# Patient Record
Sex: FEMALE | Race: OTHER | HISPANIC OR LATINO
[De-identification: names, ages, dates, MRNs, and addresses within clinical notes are randomized per-mention and may not be internally consistent; named-entity substitution may affect disease eponyms.]

---

## 2021-05-04 PROBLEM — Z00.00 ENCOUNTER FOR PREVENTIVE HEALTH EXAMINATION: Status: ACTIVE | Noted: 2021-05-04

## 2022-02-11 ENCOUNTER — APPOINTMENT (OUTPATIENT)
Dept: RHEUMATOLOGY | Facility: CLINIC | Age: 42
End: 2022-02-11

## 2023-04-16 ENCOUNTER — OFFICE (OUTPATIENT)
Dept: URBAN - METROPOLITAN AREA CLINIC 12 | Facility: CLINIC | Age: 43
Setting detail: OPHTHALMOLOGY
End: 2023-04-16
Payer: COMMERCIAL

## 2023-04-16 DIAGNOSIS — H16.223: ICD-10-CM

## 2023-04-16 DIAGNOSIS — H01.004: ICD-10-CM

## 2023-04-16 DIAGNOSIS — H01.001: ICD-10-CM

## 2023-04-16 PROCEDURE — 92014 COMPRE OPH EXAM EST PT 1/>: CPT | Performed by: STUDENT IN AN ORGANIZED HEALTH CARE EDUCATION/TRAINING PROGRAM

## 2023-04-16 ASSESSMENT — KERATOMETRY
OD_AXISANGLE_DEGREES: 059
OD_K1POWER_DIOPTERS: 43.50
OS_AXISANGLE_DEGREES: 108
OS_K2POWER_DIOPTERS: 44.25
OS_K1POWER_DIOPTERS: 44.00
METHOD_AUTO_MANUAL: AUTO
OD_K2POWER_DIOPTERS: 44.00

## 2023-04-16 ASSESSMENT — AXIALLENGTH_DERIVED
OS_AL: 23.0349
OS_AL: 23.0349
OD_AL: 23.2613
OD_AL: 23.2613

## 2023-04-16 ASSESSMENT — REFRACTION_AUTOREFRACTION
OS_AXIS: 109
OD_CYLINDER: -0.25
OD_AXIS: 041
OS_CYLINDER: -0.25
OS_SPHERE: +1.00
OD_SPHERE: +0.75

## 2023-04-16 ASSESSMENT — REFRACTION_MANIFEST
OD_VA1: 20/20
OD_SPHERE: PLANO
OS_AXIS: 118
OD_CYLINDER: -0.25
OD_VA1: 20/20
OS_AXIS: 110
OD_SPHERE: +0.75
OD_CYLINDER: -0.25
OD_AXIS: 014
OS_SPHERE: PLANO
OU_VA: 20/20-1
OS_CYLINDER: -0.50
OS_SPHERE: +1.00
OS_VA1: 20/20-2
OS_CYLINDER: -0.25
OD_AXIS: 040

## 2023-04-16 ASSESSMENT — SPHEQUIV_DERIVED
OS_SPHEQUIV: 0.875
OS_SPHEQUIV: 0.875
OD_SPHEQUIV: 0.625
OD_SPHEQUIV: 0.625

## 2023-04-16 ASSESSMENT — CONFRONTATIONAL VISUAL FIELD TEST (CVF)
OS_FINDINGS: FULL
OD_FINDINGS: FULL

## 2023-04-16 ASSESSMENT — LID EXAM ASSESSMENTS
OD_BLEPHARITIS: RUL T 1+
OS_BLEPHARITIS: LUL T 1+

## 2023-04-16 ASSESSMENT — SUPERFICIAL PUNCTATE KERATITIS (SPK)
OD_SPK: 2+
OS_SPK: 2+

## 2023-04-16 ASSESSMENT — VISUAL ACUITY
OS_BCVA: 20/20-2
OD_BCVA: 20/25

## 2023-04-16 ASSESSMENT — TONOMETRY
OS_IOP_MMHG: 17
OD_IOP_MMHG: 15

## 2023-07-16 ENCOUNTER — OFFICE (OUTPATIENT)
Dept: URBAN - METROPOLITAN AREA CLINIC 12 | Facility: CLINIC | Age: 43
Setting detail: OPHTHALMOLOGY
End: 2023-07-16
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
## 2023-07-16 PROCEDURE — NO SHOW FE NO SHOW FEE: Performed by: STUDENT IN AN ORGANIZED HEALTH CARE EDUCATION/TRAINING PROGRAM

## 2023-08-24 ENCOUNTER — APPOINTMENT (OUTPATIENT)
Dept: SPINE | Facility: CLINIC | Age: 43
End: 2023-08-24
Payer: COMMERCIAL

## 2023-08-24 ENCOUNTER — RESULT REVIEW (OUTPATIENT)
Age: 43
End: 2023-08-24

## 2023-08-24 ENCOUNTER — NON-APPOINTMENT (OUTPATIENT)
Age: 43
End: 2023-08-24

## 2023-08-24 ENCOUNTER — OUTPATIENT (OUTPATIENT)
Dept: OUTPATIENT SERVICES | Facility: HOSPITAL | Age: 43
LOS: 1 days | End: 2023-08-24
Payer: COMMERCIAL

## 2023-08-24 VITALS
BODY MASS INDEX: 27.99 KG/M2 | HEIGHT: 65 IN | OXYGEN SATURATION: 98 % | SYSTOLIC BLOOD PRESSURE: 109 MMHG | HEART RATE: 72 BPM | RESPIRATION RATE: 18 BRPM | DIASTOLIC BLOOD PRESSURE: 75 MMHG | WEIGHT: 168 LBS

## 2023-08-24 DIAGNOSIS — Z78.9 OTHER SPECIFIED HEALTH STATUS: ICD-10-CM

## 2023-08-24 DIAGNOSIS — M19.90 UNSPECIFIED OSTEOARTHRITIS, UNSPECIFIED SITE: ICD-10-CM

## 2023-08-24 DIAGNOSIS — M51.36 OTHER INTERVERTEBRAL DISC DEGENERATION, LUMBAR REGION: ICD-10-CM

## 2023-08-24 PROCEDURE — 72114 X-RAY EXAM L-S SPINE BENDING: CPT

## 2023-08-24 PROCEDURE — 72114 X-RAY EXAM L-S SPINE BENDING: CPT | Mod: 26

## 2023-08-24 PROCEDURE — 99203 OFFICE O/P NEW LOW 30 MIN: CPT

## 2023-08-24 NOTE — PHYSICAL EXAM
[Normal] : Gait: normal [] : Sensory: [2+] : left ankle jerk 2+ [Philippe's Sign] : negative Philippe's sign [de-identified] : MRI L-spine wo in May 2023 @ [facility] showed minimal disc bulge @ L4-S1 without thecal sac compression. otherwise unremarkable. Xray L-spine 6 views today in PACS showed no motion dynamic.

## 2023-08-24 NOTE — ASSESSMENT
[FreeTextEntry1] : Images reviewed by Dr. Linares today, unremarkable images. No neurosurgical intervention is recommended at this time.  PLAN - referral to rheumatology for autoimmune disease evaluation - continue conservative management - RTC PRN
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
## 2023-11-12 ENCOUNTER — OFFICE (OUTPATIENT)
Dept: URBAN - METROPOLITAN AREA CLINIC 12 | Facility: CLINIC | Age: 43
Setting detail: OPHTHALMOLOGY
End: 2023-11-12
Payer: COMMERCIAL

## 2023-11-12 DIAGNOSIS — H04.122: ICD-10-CM

## 2023-11-12 DIAGNOSIS — H04.121: ICD-10-CM

## 2023-11-12 DIAGNOSIS — H16.223: ICD-10-CM

## 2023-11-12 DIAGNOSIS — H04.123: ICD-10-CM

## 2023-11-12 DIAGNOSIS — H01.001: ICD-10-CM

## 2023-11-12 DIAGNOSIS — H01.004: ICD-10-CM

## 2023-11-12 PROCEDURE — 83861 MICROFLUID ANALY TEARS: CPT | Mod: QW,RT | Performed by: STUDENT IN AN ORGANIZED HEALTH CARE EDUCATION/TRAINING PROGRAM

## 2023-11-12 PROCEDURE — 99213 OFFICE O/P EST LOW 20 MIN: CPT | Performed by: STUDENT IN AN ORGANIZED HEALTH CARE EDUCATION/TRAINING PROGRAM

## 2023-11-12 PROCEDURE — 83861 MICROFLUID ANALY TEARS: CPT | Mod: QW,LT | Performed by: STUDENT IN AN ORGANIZED HEALTH CARE EDUCATION/TRAINING PROGRAM

## 2023-11-12 ASSESSMENT — REFRACTION_AUTOREFRACTION
OS_AXIS: 126
OS_SPHERE: +1.00
OD_CYLINDER: -0.25
OD_AXIS: 025
OS_CYLINDER: -0.25
OD_SPHERE: +0.75

## 2023-11-12 ASSESSMENT — LID EXAM ASSESSMENTS
OS_BLEPHARITIS: LUL T 1+
OD_BLEPHARITIS: RUL T 1+

## 2023-11-12 ASSESSMENT — REFRACTION_MANIFEST
OS_AXIS: 118
OD_AXIS: 040
OS_AXIS: 110
OS_VA1: 20/20-2
OD_VA1: 20/20
OD_CYLINDER: -0.25
OU_VA: 20/20-1
OD_CYLINDER: -0.25
OD_VA1: 20/20
OD_AXIS: 014
OS_CYLINDER: -0.25
OS_SPHERE: +1.00
OS_CYLINDER: -0.50
OD_SPHERE: PLANO
OD_SPHERE: +0.75
OS_SPHERE: PLANO

## 2023-11-12 ASSESSMENT — SUPERFICIAL PUNCTATE KERATITIS (SPK)
OD_SPK: 2+
OS_SPK: 2+

## 2023-11-12 ASSESSMENT — SPHEQUIV_DERIVED
OD_SPHEQUIV: 0.625
OD_SPHEQUIV: 0.625
OS_SPHEQUIV: 0.875
OS_SPHEQUIV: 0.875

## 2023-11-12 ASSESSMENT — CONFRONTATIONAL VISUAL FIELD TEST (CVF)
OD_FINDINGS: FULL
OS_FINDINGS: FULL

## 2023-11-12 ASSESSMENT — PUNCTA - ASSESSMENT
OD_PUNCTA: COL PLUG
OS_PUNCTA: COL PLUG

## 2024-12-23 ENCOUNTER — OFFICE (OUTPATIENT)
Dept: URBAN - METROPOLITAN AREA CLINIC 12 | Facility: CLINIC | Age: 44
Setting detail: OPHTHALMOLOGY
End: 2024-12-23
Payer: COMMERCIAL

## 2024-12-23 DIAGNOSIS — H16.223: ICD-10-CM

## 2024-12-23 DIAGNOSIS — H04.123: ICD-10-CM

## 2024-12-23 DIAGNOSIS — H01.001: ICD-10-CM

## 2024-12-23 DIAGNOSIS — H52.03: ICD-10-CM

## 2024-12-23 DIAGNOSIS — H01.004: ICD-10-CM

## 2024-12-23 PROCEDURE — 99213 OFFICE O/P EST LOW 20 MIN: CPT | Performed by: STUDENT IN AN ORGANIZED HEALTH CARE EDUCATION/TRAINING PROGRAM

## 2024-12-23 PROCEDURE — 92015 DETERMINE REFRACTIVE STATE: CPT | Performed by: STUDENT IN AN ORGANIZED HEALTH CARE EDUCATION/TRAINING PROGRAM

## 2024-12-23 ASSESSMENT — KERATOMETRY
OD_AXISANGLE_DEGREES: 076
OS_K1POWER_DIOPTERS: 44.00
OD_K2POWER_DIOPTERS: 44.00
OS_K2POWER_DIOPTERS: 44.25
METHOD_AUTO_MANUAL: AUTO
OS_AXISANGLE_DEGREES: 151
OD_K1POWER_DIOPTERS: 43.75

## 2024-12-23 ASSESSMENT — TONOMETRY
OD_IOP_MMHG: 11
OS_IOP_MMHG: 17

## 2024-12-23 ASSESSMENT — LID EXAM ASSESSMENTS
OD_BLEPHARITIS: RUL T 1+
OS_BLEPHARITIS: LUL T 1+

## 2024-12-23 ASSESSMENT — REFRACTION_MANIFEST
OS_SPHERE: +2.75
OS_AXIS: 118
OD_SPHERE: PLANO
OS_VA1: 20/20-2
OD_AXIS: 014
OD_VA1: 20/20
OD_SPHERE: +2.25
OD_SPHERE: +1.00
OS_CYLINDER: SPHERE
OS_SPHERE: +1.50
OD_VA1: 20/20
OS_SPHERE: PLANO
OS_VA1: 20/20
OD_CYLINDER: SPHERE
OD_VA1: 20/20
OU_VA: 20/20-1
OS_CYLINDER: SPHERE
OS_VA1: 20/20
OD_CYLINDER: SPHERE
OS_CYLINDER: -0.50
OD_CYLINDER: -0.25

## 2024-12-23 ASSESSMENT — REFRACTION_AUTOREFRACTION
OS_AXIS: 128
OD_AXIS: 039
OD_CYLINDER: -0.25
OD_SPHERE: +1.25
OS_SPHERE: +1.50
OS_CYLINDER: -0.50

## 2024-12-23 ASSESSMENT — CONFRONTATIONAL VISUAL FIELD TEST (CVF)
OS_FINDINGS: FULL
OD_FINDINGS: FULL

## 2024-12-23 ASSESSMENT — REFRACTION_CURRENTRX
OS_OVR_VA: 20/
OS_VPRISM_DIRECTION: SV
OD_VPRISM_DIRECTION: SV
OD_ADD: +1.25
OS_ADD: +1.25
OD_OVR_VA: 20/

## 2024-12-23 ASSESSMENT — VISUAL ACUITY
OS_BCVA: 20/25-2
OD_BCVA: 20/30-2

## 2024-12-23 ASSESSMENT — SUPERFICIAL PUNCTATE KERATITIS (SPK)
OD_SPK: 2+
OS_SPK: 2+

## 2024-12-23 ASSESSMENT — PUNCTA - ASSESSMENT
OD_PUNCTA: COL PLUG
OS_PUNCTA: COL PLUG

## 2025-07-27 ENCOUNTER — OFFICE (OUTPATIENT)
Dept: URBAN - METROPOLITAN AREA CLINIC 12 | Facility: CLINIC | Age: 45
Setting detail: OPHTHALMOLOGY
End: 2025-07-27
Payer: COMMERCIAL

## 2025-07-27 DIAGNOSIS — H01.004: ICD-10-CM

## 2025-07-27 DIAGNOSIS — H01.001: ICD-10-CM

## 2025-07-27 DIAGNOSIS — H16.223: ICD-10-CM

## 2025-07-27 DIAGNOSIS — H04.123: ICD-10-CM

## 2025-07-27 PROCEDURE — 99213 OFFICE O/P EST LOW 20 MIN: CPT | Mod: 25 | Performed by: STUDENT IN AN ORGANIZED HEALTH CARE EDUCATION/TRAINING PROGRAM

## 2025-07-27 PROCEDURE — 68761 CLOSE TEAR DUCT OPENING: CPT | Mod: 50 | Performed by: STUDENT IN AN ORGANIZED HEALTH CARE EDUCATION/TRAINING PROGRAM

## 2025-07-27 ASSESSMENT — REFRACTION_CURRENTRX
OS_VPRISM_DIRECTION: SV
OS_ADD: +1.25
OD_ADD: +1.25
OD_OVR_VA: 20/
OS_OVR_VA: 20/
OD_VPRISM_DIRECTION: SV

## 2025-07-27 ASSESSMENT — REFRACTION_MANIFEST
OD_VA1: 20/20
OU_VA: 20/20-1
OS_AXIS: 118
OD_SPHERE: +1.00
OS_SPHERE: +1.50
OD_SPHERE: +2.25
OD_CYLINDER: SPHERE
OS_SPHERE: +2.75
OS_CYLINDER: -0.50
OS_CYLINDER: SPHERE
OS_VA1: 20/20
OS_VA1: 20/20-2
OD_CYLINDER: SPHERE
OS_SPHERE: PLANO
OS_VA1: 20/20
OD_CYLINDER: -0.25
OD_VA1: 20/20
OD_AXIS: 014
OS_CYLINDER: SPHERE
OD_VA1: 20/20
OD_SPHERE: PLANO

## 2025-07-27 ASSESSMENT — KERATOMETRY
METHOD_AUTO_MANUAL: AUTO
OS_AXISANGLE_DEGREES: 126
OS_K1POWER_DIOPTERS: 44.00
OD_AXISANGLE_DEGREES: 060
OD_K2POWER_DIOPTERS: 44.00
OD_K1POWER_DIOPTERS: 43.50
OS_K2POWER_DIOPTERS: 44.25

## 2025-07-27 ASSESSMENT — SUPERFICIAL PUNCTATE KERATITIS (SPK)
OS_SPK: 2+
OD_SPK: 2+

## 2025-07-27 ASSESSMENT — VISUAL ACUITY
OD_BCVA: 20/40+2
OS_BCVA: 20/25-2

## 2025-07-27 ASSESSMENT — REFRACTION_AUTOREFRACTION
OD_AXIS: 016
OD_CYLINDER: -0.50
OS_SPHERE: +1.50
OD_SPHERE: +1.50
OS_AXIS: 098
OS_CYLINDER: -0.50

## 2025-07-27 ASSESSMENT — PUNCTA - ASSESSMENT
OS_PUNCTA: COL PLUG
OD_PUNCTA: COL PLUG

## 2025-07-27 ASSESSMENT — CONFRONTATIONAL VISUAL FIELD TEST (CVF)
OD_FINDINGS: FULL
OS_FINDINGS: FULL

## 2025-07-27 ASSESSMENT — LID EXAM ASSESSMENTS
OS_BLEPHARITIS: LUL T 1+
OD_BLEPHARITIS: RUL T 1+